# Patient Record
Sex: MALE | URBAN - METROPOLITAN AREA
[De-identification: names, ages, dates, MRNs, and addresses within clinical notes are randomized per-mention and may not be internally consistent; named-entity substitution may affect disease eponyms.]

---

## 2022-04-29 ENCOUNTER — OFFICE VISIT (OUTPATIENT)
Dept: URBAN - METROPOLITAN AREA CLINIC 41 | Facility: CLINIC | Age: 67
End: 2022-04-29
Payer: COMMERCIAL

## 2022-04-29 DIAGNOSIS — H33.313 HORSESHOE TEAR OF RETINA WITHOUT DETACHMENT, BILATERAL: Primary | ICD-10-CM

## 2022-04-29 DIAGNOSIS — H25.13 AGE-RELATED NUCLEAR CATARACT, BILATERAL: ICD-10-CM

## 2022-04-29 PROCEDURE — 92134 CPTRZ OPH DX IMG PST SGM RTA: CPT | Performed by: OPHTHALMOLOGY

## 2022-04-29 PROCEDURE — 92201 OPSCPY EXTND RTA DRAW UNI/BI: CPT | Performed by: OPHTHALMOLOGY

## 2022-04-29 PROCEDURE — 99205 OFFICE O/P NEW HI 60 MIN: CPT | Performed by: OPHTHALMOLOGY

## 2022-04-29 PROCEDURE — 67145 PROPH RTA DTCHMNT PC: CPT | Performed by: OPHTHALMOLOGY

## 2022-04-29 ASSESSMENT — INTRAOCULAR PRESSURE
OS: 23
OD: 28

## 2022-04-29 NOTE — IMPRESSION/PLAN
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 Plan: Careful scleral depressed exam demonstrates low lying retinoschisis OD. No inner or outer retinal holes identified. Discussed that retinoschisis is not likely to progress, unless the retinoschisis were to develop inner and outer retinal holes. Warning symptoms discussed. Reassurance provided.

## 2022-04-29 NOTE — IMPRESSION/PLAN
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 OD: with acute PVD symptoms OS: with tr SRF Plan: Exam demonstrates that the patient has a retinal tear OU. We discussed that an untreated retinal tear could progress into a retinal detachment causing significant and irreversible visual loss. The R/B/A of the various treatment options including laser and cryo treatment were discussed with the patient. We recommend urgent same day treatment to reduce the risk of a retinal detachment. Risks of treatment include but are not limited to reduced peripheral vision, pain, swelling, recurrent hemorrhage, progressive retinal tear or retinal detachment, new retinal tear and need for additional laser, cryo, or surgery. After discussing the risks, benefits, indications, and alternatives, the patient elects to proceed with laser treatment to the retinal tear. Laser retinopexy was performed without complication today in clinic RTC 2-4 wks EP with OCT OU Rosalba Nuñez)

## 2022-05-16 ENCOUNTER — OFFICE VISIT (OUTPATIENT)
Dept: URBAN - METROPOLITAN AREA CLINIC 47 | Facility: CLINIC | Age: 67
End: 2022-05-16
Payer: COMMERCIAL

## 2022-05-16 DIAGNOSIS — H33.102 UNSPECIFIED RETINOSCHISIS, LEFT EYE: ICD-10-CM

## 2022-05-16 PROCEDURE — 99213 OFFICE O/P EST LOW 20 MIN: CPT | Performed by: OPHTHALMOLOGY

## 2022-05-16 ASSESSMENT — INTRAOCULAR PRESSURE
OS: 24
OD: 22

## 2022-05-16 NOTE — IMPRESSION/PLAN
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
s/p laser retinopexy OU Plan: He's doing well with good laser OU (Dr. Anita Hodges) and no new retinal tears/RD. observe with RD precautions. RD signs discussed and he will call us with any new visual changes. Otherwise, he will f/u with your excellent care. thanks Dr. Stacey Srivastava RTC PRN